# Patient Record
Sex: FEMALE | Race: BLACK OR AFRICAN AMERICAN | NOT HISPANIC OR LATINO | URBAN - METROPOLITAN AREA
[De-identification: names, ages, dates, MRNs, and addresses within clinical notes are randomized per-mention and may not be internally consistent; named-entity substitution may affect disease eponyms.]

---

## 2018-04-26 ENCOUNTER — APPOINTMENT (OUTPATIENT)
Age: 33
Setting detail: DERMATOLOGY
End: 2018-05-01

## 2018-04-26 DIAGNOSIS — L63.8 OTHER ALOPECIA AREATA: ICD-10-CM

## 2018-04-26 DIAGNOSIS — L21.8 OTHER SEBORRHEIC DERMATITIS: ICD-10-CM

## 2018-04-26 PROCEDURE — OTHER TREATMENT REGIMEN: OTHER

## 2018-04-26 PROCEDURE — 99203 OFFICE O/P NEW LOW 30 MIN: CPT

## 2018-04-26 PROCEDURE — OTHER PRESCRIPTION: OTHER

## 2018-04-26 PROCEDURE — OTHER MIPS QUALITY: OTHER

## 2018-04-26 PROCEDURE — OTHER COUNSELING: OTHER

## 2018-04-26 RX ORDER — FLUOCINOLONE ACETONIDE 0.11 MG/ML
OIL TOPICAL
Qty: 1 | Refills: 1 | Status: ERX | COMMUNITY
Start: 2018-04-26

## 2018-04-26 ASSESSMENT — LOCATION ZONE DERM: LOCATION ZONE: SCALP

## 2018-04-26 ASSESSMENT — LOCATION SIMPLE DESCRIPTION DERM
LOCATION SIMPLE: POSTERIOR SCALP
LOCATION SIMPLE: SCALP

## 2018-04-26 ASSESSMENT — LOCATION DETAILED DESCRIPTION DERM
LOCATION DETAILED: RIGHT SUPERIOR PARIETAL SCALP
LOCATION DETAILED: RIGHT OCCIPITAL SCALP
LOCATION DETAILED: MID-OCCIPITAL SCALP

## 2018-04-26 NOTE — HPI: HAIR LOSS
Previous Labs: Yes
How Did The Hair Loss Occur?: sudden in onset
How Severe Is Your Hair Loss?: severe
What Hair Products Do You Use?: Cantu
Additional History: Pt had labs done by her provider
When Were The Labs Drawn? (Drawn...): 04/06/18

## 2018-04-26 NOTE — PROCEDURE: TREATMENT REGIMEN
Modify Regimen: Do not do any tight or twisting hairstyles
Detail Level: Simple
Initiate Treatment: Dermasmoothe scalp oil QD
Plan: I recommended to start with Dermasmoothe Scalp oil  since the patient also has uncontrolled Seborrheic Dermatitis which can be contributing to her hair loss. I discussed with the patient that she also has a significant amount of traction alopecia noted on the hairline and has very heavy long alyssia in today. I advised her not to do the alyssia and expressed concern that as her hair grows back (hopefully) that if she puts alyssia on the new fine \"new\" hairs that the hairs will break and continue to fall out. The patient stated she only does the braid because she has to cover the bald areas.  She did ask about steroid injections that her OB/GYN had told her about. The patient stated she is afraid that the topical Dermasmoothe oil will be too expensive and she stated that she just doesn't want to waste her time because she has a $50 copay and has gone to a dermatologist before and the acne medications were not affordable or not covered at all. I advised pt that unfortunately I can only prescribe what I think is the best option for her and the coverage depends on her insurance formulary. I do not get to make the formulary for her insurance. I just prescribe what I think is going to best for them. In regards to the injections ([place] Kenalog) I told her I would not be doing these today. She may have a deductible and I do not want her getting a large bill for it if it is not covered. I told her that  I could have Pretty look into her insurance to see if it’s covered and what her out of pocket cost would be. Pt was given the ICD-10 code and procedure code for her to call insurance to see if they will cover the [place] Kenalog. Pt stated that she has had labs done on the 6th and everything WNL. I did provide her with a list of labs to be sure that her OB/GYN checked and if not they or her PCP can order them: CBC w/diff, TSH, SHAI,IRON,TIBC,Serum Ferritin,and RPR
Otc Regimen: Juwan Matrix 5000 purchase online and take daily

## 2018-05-24 ENCOUNTER — APPOINTMENT (OUTPATIENT)
Age: 33
Setting detail: DERMATOLOGY
End: 2018-05-24

## 2018-05-24 DIAGNOSIS — L21.8 OTHER SEBORRHEIC DERMATITIS: ICD-10-CM

## 2018-05-24 DIAGNOSIS — L63.8 OTHER ALOPECIA AREATA: ICD-10-CM

## 2018-05-24 PROCEDURE — OTHER TREATMENT REGIMEN: OTHER

## 2018-05-24 PROCEDURE — OTHER COUNSELING: OTHER

## 2018-05-24 PROCEDURE — 11901 INJECT SKIN LESIONS >7: CPT

## 2018-05-24 PROCEDURE — 99213 OFFICE O/P EST LOW 20 MIN: CPT | Mod: 25

## 2018-05-24 PROCEDURE — OTHER INTRALESIONAL KENALOG: OTHER

## 2018-05-24 ASSESSMENT — LOCATION SIMPLE DESCRIPTION DERM: LOCATION SIMPLE: SCALP

## 2018-05-24 ASSESSMENT — LOCATION DETAILED DESCRIPTION DERM
LOCATION DETAILED: LEFT CENTRAL POSTAURICULAR SKIN
LOCATION DETAILED: RIGHT INFERIOR POSTAURICULAR SKIN
LOCATION DETAILED: RIGHT CENTRAL POSTAURICULAR SKIN
LOCATION DETAILED: RIGHT CENTRAL PARIETAL SCALP
LOCATION DETAILED: RIGHT INFERIOR OCCIPITAL SCALP
LOCATION DETAILED: LEFT INFERIOR OCCIPITAL SCALP
LOCATION DETAILED: RIGHT SUPERIOR POSTAURICULAR SKIN
LOCATION DETAILED: RIGHT INFERIOR PARIETAL SCALP
LOCATION DETAILED: LEFT SUPERIOR POSTAURICULAR SKIN

## 2018-05-24 ASSESSMENT — LOCATION ZONE DERM: LOCATION ZONE: SCALP

## 2018-05-24 NOTE — PROCEDURE: TREATMENT REGIMEN
Detail Level: Simple
Continue Regimen: Derma-smooth/FS scalp oil 0.01% apply to apply to scalp daily \\nElon Matrix 5000
Continue Regimen: Dermasmoothe scalp oil QOHS

## 2018-12-12 ENCOUNTER — APPOINTMENT (OUTPATIENT)
Age: 33
Setting detail: DERMATOLOGY
End: 2018-12-14

## 2018-12-12 DIAGNOSIS — L65.8 OTHER SPECIFIED NONSCARRING HAIR LOSS: ICD-10-CM

## 2018-12-12 DIAGNOSIS — L63.8 OTHER ALOPECIA AREATA: ICD-10-CM

## 2018-12-12 PROCEDURE — OTHER COUNSELING: OTHER

## 2018-12-12 PROCEDURE — 99213 OFFICE O/P EST LOW 20 MIN: CPT | Mod: 25

## 2018-12-12 PROCEDURE — OTHER MIPS QUALITY: OTHER

## 2018-12-12 PROCEDURE — 11901 INJECT SKIN LESIONS >7: CPT

## 2018-12-12 PROCEDURE — OTHER INTRALESIONAL KENALOG: OTHER

## 2018-12-12 ASSESSMENT — LOCATION DETAILED DESCRIPTION DERM
LOCATION DETAILED: LEFT CENTRAL POSTAURICULAR SKIN
LOCATION DETAILED: RIGHT SUPERIOR POSTAURICULAR SKIN
LOCATION DETAILED: LEFT SUPERIOR POSTAURICULAR SKIN
LOCATION DETAILED: RIGHT CENTRAL POSTAURICULAR SKIN
LOCATION DETAILED: LEFT INFERIOR OCCIPITAL SCALP
LOCATION DETAILED: RIGHT INFERIOR PARIETAL SCALP
LOCATION DETAILED: RIGHT INFERIOR OCCIPITAL SCALP
LOCATION DETAILED: RIGHT INFERIOR POSTAURICULAR SKIN

## 2018-12-12 ASSESSMENT — LOCATION SIMPLE DESCRIPTION DERM: LOCATION SIMPLE: SCALP

## 2018-12-12 ASSESSMENT — LOCATION ZONE DERM: LOCATION ZONE: SCALP

## 2023-07-25 ENCOUNTER — OFFICE VISIT (OUTPATIENT)
Dept: URBAN - METROPOLITAN AREA CLINIC 52 | Facility: CLINIC | Age: 38
End: 2023-07-25
Payer: COMMERCIAL

## 2023-07-25 DIAGNOSIS — H10.013 ACUTE BILATERAL FOLLICULAR CONJUNCTIVITIS: Primary | ICD-10-CM

## 2023-07-25 PROCEDURE — 99203 OFFICE O/P NEW LOW 30 MIN: CPT

## 2023-07-25 RX ORDER — NEOMYCIN SULFATE, POLYMYXIN B SULFATE AND DEXAMETHASONE 3.5; 10000; 1 MG/ML; [USP'U]/ML; MG/ML
SUSPENSION OPHTHALMIC
Qty: 5 | Refills: 1 | Status: ACTIVE
Start: 2023-07-25

## 2023-07-25 ASSESSMENT — KERATOMETRY
OD: 44.25
OS: 44.25

## 2023-07-25 ASSESSMENT — INTRAOCULAR PRESSURE
OS: 19
OD: 20